# Patient Record
Sex: FEMALE | Race: BLACK OR AFRICAN AMERICAN | NOT HISPANIC OR LATINO | Employment: UNEMPLOYED | URBAN - METROPOLITAN AREA
[De-identification: names, ages, dates, MRNs, and addresses within clinical notes are randomized per-mention and may not be internally consistent; named-entity substitution may affect disease eponyms.]

---

## 2023-10-05 ENCOUNTER — TELEPHONE (OUTPATIENT)
Dept: ADMISSION | Facility: HOSPITAL | Age: 53
End: 2023-10-05
Payer: MEDICAID

## 2023-10-05 DIAGNOSIS — D57.00 SICKLE CELL DISEASE WITH CRISIS (MULTI): Primary | ICD-10-CM

## 2023-10-05 RX ORDER — OXYCODONE HYDROCHLORIDE 5 MG/1
5 TABLET ORAL EVERY 6 HOURS PRN
Qty: 30 TABLET | Refills: 0 | Status: SHIPPED | OUTPATIENT
Start: 2023-10-05 | End: 2023-10-19 | Stop reason: SDUPTHER

## 2023-10-05 RX ORDER — OXYCODONE HYDROCHLORIDE 5 MG/1
5 TABLET ORAL EVERY 6 HOURS PRN
COMMUNITY
End: 2023-10-05 | Stop reason: SDUPTHER

## 2023-10-19 ENCOUNTER — TELEPHONE (OUTPATIENT)
Dept: ADMISSION | Facility: HOSPITAL | Age: 53
End: 2023-10-19
Payer: MEDICAID

## 2023-10-19 DIAGNOSIS — D57.00 SICKLE CELL DISEASE WITH CRISIS (MULTI): ICD-10-CM

## 2023-10-19 RX ORDER — OXYCODONE HYDROCHLORIDE 5 MG/1
5 TABLET ORAL EVERY 6 HOURS PRN
Qty: 30 TABLET | Refills: 0 | Status: SHIPPED | OUTPATIENT
Start: 2023-10-19 | End: 2023-11-02 | Stop reason: SDUPTHER

## 2023-11-02 ENCOUNTER — TELEPHONE (OUTPATIENT)
Dept: ADMISSION | Facility: HOSPITAL | Age: 53
End: 2023-11-02
Payer: MEDICAID

## 2023-11-02 DIAGNOSIS — D57.00 SICKLE CELL DISEASE WITH CRISIS (MULTI): ICD-10-CM

## 2023-11-02 RX ORDER — OXYCODONE HYDROCHLORIDE 5 MG/1
5 TABLET ORAL EVERY 6 HOURS PRN
Qty: 30 TABLET | Refills: 0 | Status: SHIPPED | OUTPATIENT
Start: 2023-11-02 | End: 2023-11-17 | Stop reason: SDUPTHER

## 2023-11-02 NOTE — TELEPHONE ENCOUNTER
Jennifer Machado called the refill line for Oxycodone 5mg. Requesting refills be sent to [facility] pharmacy; message sent to Sickle Cell team to submit.

## 2023-11-10 DIAGNOSIS — D57.00 SICKLE CELL DISEASE WITH CRISIS (MULTI): Primary | ICD-10-CM

## 2023-11-16 DIAGNOSIS — D57.00 SICKLE CELL DISEASE WITH CRISIS (MULTI): Primary | ICD-10-CM

## 2023-11-16 RX ORDER — CYCLOBENZAPRINE HCL 5 MG
5 TABLET ORAL 3 TIMES DAILY
COMMUNITY
Start: 2023-06-16 | End: 2023-11-16 | Stop reason: SDUPTHER

## 2023-11-16 NOTE — TELEPHONE ENCOUNTER
Pt called the refill line requesting a refill on her Oxycodone 5mg and Flexeril 5mg. Flexeril pend/send to MD to refill. Pt uses [facility] on file. Message sent to the team.

## 2023-11-17 RX ORDER — NALOXONE HYDROCHLORIDE 4 MG/.1ML
4 SPRAY NASAL AS NEEDED
Qty: 2 EACH | Refills: 0 | Status: SHIPPED | OUTPATIENT
Start: 2023-11-17 | End: 2024-11-16

## 2023-11-17 RX ORDER — CYCLOBENZAPRINE HCL 5 MG
5 TABLET ORAL 3 TIMES DAILY
Qty: 60 TABLET | Refills: 0 | Status: SHIPPED | OUTPATIENT
Start: 2023-11-17 | End: 2023-11-29 | Stop reason: SDUPTHER

## 2023-11-17 RX ORDER — OXYCODONE HYDROCHLORIDE 5 MG/1
5 TABLET ORAL EVERY 6 HOURS PRN
Qty: 30 TABLET | Refills: 0 | Status: SHIPPED | OUTPATIENT
Start: 2023-11-17 | End: 2023-11-29 | Stop reason: SDUPTHER

## 2023-11-29 DIAGNOSIS — D57.00 SICKLE CELL DISEASE WITH CRISIS (MULTI): ICD-10-CM

## 2023-11-29 RX ORDER — CYCLOBENZAPRINE HCL 5 MG
5 TABLET ORAL 3 TIMES DAILY
Qty: 60 TABLET | Refills: 0 | Status: SHIPPED | OUTPATIENT
Start: 2023-11-29

## 2023-11-29 RX ORDER — OXYCODONE HYDROCHLORIDE 5 MG/1
5 TABLET ORAL EVERY 6 HOURS PRN
Qty: 30 TABLET | Refills: 0 | Status: SHIPPED | OUTPATIENT
Start: 2023-11-29 | End: 2023-12-19 | Stop reason: SDUPTHER

## 2023-12-01 DIAGNOSIS — D57.00 SICKLE CELL DISEASE WITH CRISIS (MULTI): ICD-10-CM

## 2023-12-05 ENCOUNTER — TELEPHONE (OUTPATIENT)
Dept: ADMISSION | Facility: HOSPITAL | Age: 53
End: 2023-12-05
Payer: MEDICAID

## 2023-12-05 DIAGNOSIS — D57.00 SICKLE CELL DISEASE WITH CRISIS (MULTI): Primary | ICD-10-CM

## 2023-12-05 RX ORDER — IBUPROFEN 600 MG/1
600 TABLET ORAL EVERY 8 HOURS PRN
Qty: 30 TABLET | Refills: 1 | Status: SHIPPED | OUTPATIENT
Start: 2023-12-05 | End: 2024-01-04

## 2023-12-05 RX ORDER — ACETAMINOPHEN 500 MG
1000 TABLET ORAL EVERY 8 HOURS PRN
Qty: 60 TABLET | Refills: 1 | Status: SHIPPED | OUTPATIENT
Start: 2023-12-05 | End: 2023-12-15

## 2023-12-05 NOTE — TELEPHONE ENCOUNTER
Pt states she has been sick for 2 days, tested positive for covid. Pt is experiencing some bone/joint pain as well.   She is not feeling well and would like to speak to Dr. Ray re return to work letter needed.

## 2023-12-07 NOTE — TELEPHONE ENCOUNTER
Pt states that she is awaiting letter to work - she feels well enough to return to work today (Thur 12/7)   She works from home.   Please send letter to email address as request.

## 2023-12-11 NOTE — TELEPHONE ENCOUNTER
Pt left a voicemail on RN triage line following up on a return to work letter.  She went back to work yesterday, 12/10/23.

## 2023-12-19 ENCOUNTER — TELEPHONE (OUTPATIENT)
Dept: ADMISSION | Facility: HOSPITAL | Age: 53
End: 2023-12-19
Payer: MEDICAID

## 2023-12-19 DIAGNOSIS — D57.00 SICKLE CELL DISEASE WITH CRISIS (MULTI): ICD-10-CM

## 2023-12-19 RX ORDER — OXYCODONE HYDROCHLORIDE 5 MG/1
5 TABLET ORAL EVERY 6 HOURS PRN
Qty: 30 TABLET | Refills: 0 | Status: SHIPPED | OUTPATIENT
Start: 2023-12-19 | End: 2023-12-29 | Stop reason: SDUPTHER

## 2023-12-19 NOTE — TELEPHONE ENCOUNTER
Jennifer Machado called the refill line for oxycodone 5mg. Requesting refills be sent to [facility] pharmacy; message sent to Sickle Cell team to submit.

## 2023-12-29 ENCOUNTER — TELEPHONE (OUTPATIENT)
Dept: ADMISSION | Facility: HOSPITAL | Age: 53
End: 2023-12-29
Payer: MEDICAID

## 2023-12-29 DIAGNOSIS — D57.00 SICKLE CELL DISEASE WITH CRISIS (MULTI): ICD-10-CM

## 2023-12-29 RX ORDER — OXYCODONE HYDROCHLORIDE 5 MG/1
5 TABLET ORAL EVERY 6 HOURS PRN
Qty: 30 TABLET | Refills: 0 | Status: SHIPPED | OUTPATIENT
Start: 2023-12-29 | End: 2024-01-23 | Stop reason: SDUPTHER

## 2024-01-05 DIAGNOSIS — D57.00 SICKLE CELL DISEASE WITH CRISIS (MULTI): Primary | ICD-10-CM

## 2024-01-23 ENCOUNTER — TELEPHONE (OUTPATIENT)
Dept: ADMISSION | Facility: HOSPITAL | Age: 54
End: 2024-01-23
Payer: COMMERCIAL

## 2024-01-23 DIAGNOSIS — D57.00 SICKLE CELL DISEASE WITH CRISIS (MULTI): ICD-10-CM

## 2024-01-23 RX ORDER — OXYCODONE HYDROCHLORIDE 5 MG/1
5 TABLET ORAL EVERY 6 HOURS PRN
Qty: 30 TABLET | Refills: 0 | Status: SHIPPED | OUTPATIENT
Start: 2024-01-23 | End: 2024-02-08 | Stop reason: ALTCHOICE

## 2024-01-23 NOTE — TELEPHONE ENCOUNTER
Jennifer Machado called the refill line for Oxycodone. Requesting refills be sent to [facility] pharmacy; message sent to Sickle Cell team to submit.

## 2024-02-08 ENCOUNTER — OFFICE VISIT (OUTPATIENT)
Dept: HEMATOLOGY/ONCOLOGY | Facility: HOSPITAL | Age: 54
End: 2024-02-08
Payer: COMMERCIAL

## 2024-02-08 VITALS
HEART RATE: 71 BPM | WEIGHT: 162.26 LBS | BODY MASS INDEX: 29.86 KG/M2 | OXYGEN SATURATION: 100 % | HEIGHT: 62 IN | SYSTOLIC BLOOD PRESSURE: 120 MMHG | TEMPERATURE: 97.2 F | RESPIRATION RATE: 18 BRPM | DIASTOLIC BLOOD PRESSURE: 62 MMHG

## 2024-02-08 DIAGNOSIS — D57.20 SICKLE CELL DISEASE, TYPE SC, WITHOUT CRISIS (MULTI): Primary | ICD-10-CM

## 2024-02-08 PROCEDURE — 99203 OFFICE O/P NEW LOW 30 MIN: CPT | Performed by: NURSE PRACTITIONER

## 2024-02-08 PROCEDURE — 99213 OFFICE O/P EST LOW 20 MIN: CPT | Performed by: NURSE PRACTITIONER

## 2024-02-08 PROCEDURE — 4004F PT TOBACCO SCREEN RCVD TLK: CPT | Performed by: NURSE PRACTITIONER

## 2024-02-08 RX ORDER — FOLIC ACID 1 MG/1
1 TABLET ORAL DAILY
Qty: 90 TABLET | Refills: 1 | Status: SHIPPED | OUTPATIENT
Start: 2024-02-08 | End: 2024-04-12 | Stop reason: SDUPTHER

## 2024-02-08 RX ORDER — OXYCODONE AND ACETAMINOPHEN 5; 325 MG/1; MG/1
1 TABLET ORAL EVERY 6 HOURS PRN
Qty: 5 TABLET | Refills: 0 | Status: SHIPPED | OUTPATIENT
Start: 2024-02-08 | End: 2024-02-12 | Stop reason: WASHOUT

## 2024-02-08 ASSESSMENT — PAIN SCALES - GENERAL: PAINLEVEL: 0-NO PAIN

## 2024-02-08 NOTE — PROGRESS NOTES
"Patient ID: Jennifer Machado is a 54 y.o. female.  Referring Physician: No referring provider defined for this encounter.  Primary Care Provider: Shaw Griffiths DO  Visit Type: Follow Up      Subjective   54 year old black female presents with Sickle Cell Disease Type S.C. States she has a lot of achiness when the weather is rainy or cold. Does not like to take medication or come to the doctor. She tries to ignore pain when it occurs. Does not leave the home very often. Denies constipation as she does take a stool softener. No recent visits to [facility] or ED. She referenced a \"little coma\" 7 years ago. She wants to stay on Folic Acid since she feels it helps her and wants to take Percoset instead of Oxycodone and Tylenol separately. Her demeanor is talkative and cooperative with me.    HPI Sickle Cell S.C. type with pain in left shoulder and left side of body.    Review of Systems - Oncology     Objective   BSA: 1.8 meters squared  /62 (BP Location: Left arm, Patient Position: Sitting, BP Cuff Size: Adult)   Pulse 71   Temp 36.2 °C (97.2 °F) (Temporal)   Resp 18   Ht (S) 1.576 m (5' 2.05\")   Wt 73.6 kg (162 lb 4.1 oz)   SpO2 100%   BMI 29.63 kg/m²      has no past medical history on file.   has a past surgical history that includes MR angio head wo IV contrast (4/21/2017) and MR angio neck wo IV contrast (4/21/2017).  No family history on file.  Oncology History    No history exists.       Jennifer Machado  reports that she has been smoking cigarettes. She has never used smokeless tobacco.  She  has no history on file for alcohol use.  She  has no history on file for drug use.    Physical Exam  Vitals reviewed.   Constitutional:       Appearance: Normal appearance.   HENT:      Head: Normocephalic.      Nose: Nose normal.   Eyes:      General: Scleral icterus present.      Pupils: Pupils are equal, round, and reactive to light.   Cardiovascular:      Rate and Rhythm: Normal rate and regular rhythm. " "  Pulmonary:      Effort: Pulmonary effort is normal.      Breath sounds: Normal breath sounds.   Abdominal:      General: Abdomen is flat. Bowel sounds are normal.      Palpations: Abdomen is soft.   Musculoskeletal:         General: Normal range of motion.      Cervical back: Normal range of motion.   Skin:     General: Skin is dry.      Capillary Refill: Capillary refill takes less than 2 seconds.   Neurological:      General: No focal deficit present.      Mental Status: She is alert and oriented to person, place, and time.   Psychiatric:         Mood and Affect: Mood normal.         Behavior: Behavior normal.     WBC   Date/Time Value Ref Range Status   03/14/2023 09:21 AM CANCELED       Comment:     Result canceled by the ancillary.   12/27/2022 02:42 PM 6.4 4.4 - 11.3 x10E9/L Final   07/27/2022 09:42 AM 8.3 4.4 - 11.3 x10E9/L Final     nRBC   Date Value Ref Range Status   03/14/2023 CANCELED       Comment:     Result canceled by the ancillary.     RBC   Date Value Ref Range Status   03/14/2023 CANCELED       Comment:     Result canceled by the ancillary.   12/27/2022 3.43 (L) 4.00 - 5.20 x10E12/L Final   07/27/2022 3.80 (L) 4.00 - 5.20 x10E12/L Final     Hemoglobin   Date Value Ref Range Status   03/14/2023 CANCELED       Comment:     Result canceled by the ancillary.   12/27/2022 10.5 (L) 12.0 - 16.0 g/dL Final   07/27/2022 11.4 (L) 12.0 - 16.0 g/dL Final     Hematocrit   Date Value Ref Range Status   03/14/2023 CANCELED       Comment:     Result canceled by the ancillary.   12/27/2022 28.8 (L) 36.0 - 46.0 % Final   07/27/2022 32.5 (L) 36.0 - 46.0 % Final     MCV   Date/Time Value Ref Range Status   03/14/2023 09:21 AM CANCELED       Comment:     Result canceled by the ancillary.   12/27/2022 02:42 PM 84 80 - 100 fL Final   07/27/2022 09:42 AM 86 80 - 100 fL Final     No results found for: \"MCH\"  MCHC   Date/Time Value Ref Range Status   03/14/2023 09:21 AM CANCELED       Comment:     Result canceled by the " "ancillary.   12/27/2022 02:42 PM 36.5 (H) 32.0 - 36.0 g/dL Final   07/27/2022 09:42 AM 35.1 32.0 - 36.0 g/dL Final     RDW   Date/Time Value Ref Range Status   03/14/2023 09:21 AM CANCELED       Comment:     Result canceled by the ancillary.   12/27/2022 02:42 PM 16.7 (H) 11.5 - 14.5 % Final   07/27/2022 09:42 AM 16.6 (H) 11.5 - 14.5 % Final     Platelets   Date/Time Value Ref Range Status   03/14/2023 09:21 AM CANCELED       Comment:     Result canceled by the ancillary.   12/27/2022 02:42  150 - 450 x10E9/L Final   07/27/2022 09:42  150 - 450 x10E9/L Final     No results found for: \"MPV\"  Neutrophils %   Date/Time Value Ref Range Status   03/14/2023 09:21 AM CANCELED       Comment:     Result canceled by the ancillary.   12/27/2022 02:42 PM 64.2 40.0 - 80.0 % Final   07/27/2022 09:42 AM 62.3 40.0 - 80.0 % Final     Immature Granulocytes %, Automated   Date/Time Value Ref Range Status   03/14/2023 09:21 AM CANCELED       Comment:      Immature Granulocyte Count (IG) includes promyelocytes,    myelocytes and metamyelocytes but does not include bands.   Percent differential counts (%) should be interpreted in the   context of the absolute cell counts (cells/L).    Result canceled by the ancillary.     12/27/2022 02:42 PM 0.3 0.0 - 0.9 % Final     Comment:      Immature Granulocyte Count (IG) includes promyelocytes,    myelocytes and metamyelocytes but does not include bands.   Percent differential counts (%) should be interpreted in the   context of the absolute cell counts (cells/L).     07/27/2022 09:42 AM 0.4 0.0 - 0.9 % Final     Comment:      Immature Granulocyte Count (IG) includes promyelocytes,    myelocytes and metamyelocytes but does not include bands.   Percent differential counts (%) should be interpreted in the   context of the absolute cell counts (cells/L).       Lymphocytes %   Date/Time Value Ref Range Status   03/14/2023 09:21 AM CANCELED       Comment:     Result canceled by the " "ancillary.   12/27/2022 02:42 PM 29.4 13.0 - 44.0 % Final   07/27/2022 09:42 AM 29.3 13.0 - 44.0 % Final     Monocytes %   Date/Time Value Ref Range Status   03/14/2023 09:21 AM CANCELED       Comment:     Result canceled by the ancillary.   12/27/2022 02:42 PM 3.9 2.0 - 10.0 % Final   07/27/2022 09:42 AM 5.4 2.0 - 10.0 % Final     Eosinophils %   Date/Time Value Ref Range Status   03/14/2023 09:21 AM CANCELED       Comment:     Result canceled by the ancillary.   12/27/2022 02:42 PM 1.7 0.0 - 6.0 % Final   07/27/2022 09:42 AM 2.2 0.0 - 6.0 % Final     Basophils %   Date/Time Value Ref Range Status   03/14/2023 09:21 AM CANCELED       Comment:     Result canceled by the ancillary.   12/27/2022 02:42 PM 0.5 0.0 - 2.0 % Final   07/27/2022 09:42 AM 0.4 0.0 - 2.0 % Final     Neutrophils Absolute   Date/Time Value Ref Range Status   03/14/2023 09:21 AM CANCELED       Comment:     Result canceled by the ancillary.   12/27/2022 02:42 PM 4.12 1.20 - 7.70 x10E9/L Final   07/27/2022 09:42 AM 5.20 1.20 - 7.70 x10E9/L Final     No results found for: \"IGABSOL\"  Lymphocytes Absolute   Date/Time Value Ref Range Status   03/14/2023 09:21 AM CANCELED       Comment:     Result canceled by the ancillary.   12/27/2022 02:42 PM 1.89 1.20 - 4.80 x10E9/L Final   07/27/2022 09:42 AM 2.44 1.20 - 4.80 x10E9/L Final     Monocytes Absolute   Date/Time Value Ref Range Status   03/14/2023 09:21 AM CANCELED       Comment:     Result canceled by the ancillary.   12/27/2022 02:42 PM 0.25 0.10 - 1.00 x10E9/L Final   07/27/2022 09:42 AM 0.45 0.10 - 1.00 x10E9/L Final     Eosinophils Absolute   Date/Time Value Ref Range Status   03/14/2023 09:21 AM CANCELED       Comment:     Result canceled by the ancillary.   12/27/2022 02:42 PM 0.11 0.00 - 0.70 x10E9/L Final   07/27/2022 09:42 AM 0.18 0.00 - 0.70 x10E9/L Final     Basophils Absolute   Date/Time Value Ref Range Status   03/14/2023 09:21 AM CANCELED       Comment:     Result canceled by the " "ancillary.   12/27/2022 02:42 PM 0.03 0.00 - 0.10 x10E9/L Final   07/27/2022 09:42 AM 0.03 0.00 - 0.10 x10E9/L Final       No components found for: \"PT\"  No results found for: \"APTT\"    Assessment/Plan  Follow up in 3 months. Change Oxycodone script to Percoset for pain management. Add folic acid 1 mg daily. Do lab work today for Sickle Cell panel.                 "

## 2024-02-12 ENCOUNTER — TELEPHONE (OUTPATIENT)
Dept: ADMISSION | Facility: HOSPITAL | Age: 54
End: 2024-02-12
Payer: COMMERCIAL

## 2024-02-12 DIAGNOSIS — D57.00 SICKLE CELL DISEASE WITH CRISIS (MULTI): ICD-10-CM

## 2024-02-12 RX ORDER — OXYCODONE HYDROCHLORIDE 5 MG/1
5 TABLET ORAL EVERY 6 HOURS PRN
Qty: 30 TABLET | Refills: 0 | Status: SHIPPED | OUTPATIENT
Start: 2024-02-12 | End: 2024-02-29 | Stop reason: SDUPTHER

## 2024-02-12 NOTE — TELEPHONE ENCOUNTER
Jennifer Machado called the refill line for oxycodone. Requesting refills be sent to [facility] pharmacy; message sent to Sickle Cell team to submit.    Patient states a recent change in her oxycodone prescription, when she went to  the refill there were only 5 pills ordered. Patient states she typically receives a 2 week supply.

## 2024-02-29 ENCOUNTER — TELEPHONE (OUTPATIENT)
Dept: HEMATOLOGY/ONCOLOGY | Facility: HOSPITAL | Age: 54
End: 2024-02-29
Payer: COMMERCIAL

## 2024-02-29 DIAGNOSIS — D57.00 SICKLE CELL DISEASE WITH CRISIS (MULTI): ICD-10-CM

## 2024-02-29 NOTE — TELEPHONE ENCOUNTER
Pt requesting refill   Oxycodone 5mg. 1 tablet every 6 hrs prn  Please use [facility] (added to preferred pharmacy list)  Last FUV 2/8

## 2024-03-01 RX ORDER — OXYCODONE HYDROCHLORIDE 5 MG/1
5 TABLET ORAL EVERY 6 HOURS PRN
Qty: 30 TABLET | Refills: 0 | Status: SHIPPED | OUTPATIENT
Start: 2024-03-01 | End: 2024-03-15 | Stop reason: SDUPTHER

## 2024-03-15 ENCOUNTER — TELEPHONE (OUTPATIENT)
Dept: ADMISSION | Facility: HOSPITAL | Age: 54
End: 2024-03-15
Payer: COMMERCIAL

## 2024-03-15 DIAGNOSIS — D57.00 SICKLE CELL DISEASE WITH CRISIS (MULTI): ICD-10-CM

## 2024-03-15 RX ORDER — OXYCODONE HYDROCHLORIDE 5 MG/1
5 TABLET ORAL EVERY 6 HOURS PRN
Qty: 30 TABLET | Refills: 0 | Status: SHIPPED
Start: 2024-03-15 | End: 2024-03-27 | Stop reason: SDUPTHER

## 2024-03-27 ENCOUNTER — TELEPHONE (OUTPATIENT)
Dept: ADMISSION | Facility: HOSPITAL | Age: 54
End: 2024-03-27
Payer: COMMERCIAL

## 2024-03-27 DIAGNOSIS — D57.00 SICKLE CELL DISEASE WITH CRISIS (MULTI): ICD-10-CM

## 2024-03-27 RX ORDER — OXYCODONE HYDROCHLORIDE 5 MG/1
5 TABLET ORAL EVERY 6 HOURS PRN
Qty: 30 TABLET | Refills: 0 | Status: SHIPPED | OUTPATIENT
Start: 2024-03-27 | End: 2024-04-12 | Stop reason: SDUPTHER

## 2024-03-27 RX ORDER — OXYCODONE HYDROCHLORIDE 5 MG/1
5 TABLET ORAL EVERY 6 HOURS PRN
Qty: 30 TABLET | Refills: 0 | Status: SHIPPED | OUTPATIENT
Start: 2024-03-27 | End: 2024-03-27 | Stop reason: SDUPTHER

## 2024-04-12 ENCOUNTER — TELEPHONE (OUTPATIENT)
Dept: HEMATOLOGY/ONCOLOGY | Facility: HOSPITAL | Age: 54
End: 2024-04-12
Payer: COMMERCIAL

## 2024-04-12 DIAGNOSIS — D57.20 SICKLE CELL DISEASE, TYPE SC, WITHOUT CRISIS (MULTI): ICD-10-CM

## 2024-04-12 DIAGNOSIS — D57.00 SICKLE CELL DISEASE WITH CRISIS (MULTI): ICD-10-CM

## 2024-04-12 RX ORDER — FOLIC ACID 1 MG/1
1 TABLET ORAL DAILY
Qty: 30 TABLET | Refills: 11 | Status: SHIPPED | OUTPATIENT
Start: 2024-04-12 | End: 2024-05-29 | Stop reason: SDUPTHER

## 2024-04-12 RX ORDER — OXYCODONE HYDROCHLORIDE 5 MG/1
5 TABLET ORAL EVERY 6 HOURS PRN
Qty: 30 TABLET | Refills: 0 | Status: SHIPPED | OUTPATIENT
Start: 2024-04-12 | End: 2024-04-25 | Stop reason: SDUPTHER

## 2024-04-12 NOTE — TELEPHONE ENCOUNTER
Pt requesting a refill of oxycodone 5mg q6 prn, #30 and folic acid 1mg daily, #30.  [facility].

## 2024-04-25 ENCOUNTER — TELEPHONE (OUTPATIENT)
Dept: HEMATOLOGY/ONCOLOGY | Facility: HOSPITAL | Age: 54
End: 2024-04-25
Payer: COMMERCIAL

## 2024-04-25 DIAGNOSIS — D57.00 SICKLE CELL DISEASE WITH CRISIS (MULTI): ICD-10-CM

## 2024-04-25 RX ORDER — OXYCODONE HYDROCHLORIDE 5 MG/1
5 TABLET ORAL EVERY 6 HOURS PRN
Qty: 30 TABLET | Refills: 0 | Status: SHIPPED | OUTPATIENT
Start: 2024-04-25 | End: 2024-05-09 | Stop reason: SDUPTHER

## 2024-04-25 NOTE — TELEPHONE ENCOUNTER
Refill request received for Oxycodone 5mg.  [facility] [id number] [facility] [place].  Message sent to Sickle Cell team.

## 2024-05-08 ENCOUNTER — APPOINTMENT (OUTPATIENT)
Dept: HEMATOLOGY/ONCOLOGY | Facility: HOSPITAL | Age: 54
End: 2024-05-08
Payer: COMMERCIAL

## 2024-05-09 ENCOUNTER — TELEPHONE (OUTPATIENT)
Dept: ADMISSION | Facility: HOSPITAL | Age: 54
End: 2024-05-09
Payer: COMMERCIAL

## 2024-05-09 DIAGNOSIS — D57.00 SICKLE CELL DISEASE WITH CRISIS (MULTI): ICD-10-CM

## 2024-05-09 RX ORDER — OXYCODONE HYDROCHLORIDE 5 MG/1
5 TABLET ORAL EVERY 6 HOURS PRN
Qty: 30 TABLET | Refills: 0 | Status: SHIPPED | OUTPATIENT
Start: 2024-05-09 | End: 2024-05-16

## 2024-05-09 NOTE — TELEPHONE ENCOUNTER
Jennifer Machado called the refill line for Oxycosone. Requesting refills be sent to [facility] pharmacy; message sent to Sickle Cell team to submit.

## 2024-05-24 ENCOUNTER — TELEPHONE (OUTPATIENT)
Dept: ADMISSION | Facility: HOSPITAL | Age: 54
End: 2024-05-24
Payer: COMMERCIAL

## 2024-05-24 DIAGNOSIS — D57.20 SICKLE CELL DISEASE, TYPE SC, WITHOUT CRISIS (MULTI): Primary | ICD-10-CM

## 2024-05-24 DIAGNOSIS — D57.00 SICKLE CELL DISEASE WITH CRISIS (MULTI): Primary | ICD-10-CM

## 2024-05-24 RX ORDER — ACETAMINOPHEN 500 MG
1000 TABLET ORAL EVERY 6 HOURS PRN
Qty: 30 TABLET | Refills: 0 | Status: SHIPPED | OUTPATIENT
Start: 2024-05-24 | End: 2024-06-03

## 2024-05-24 RX ORDER — ACETAMINOPHEN 500 MG
1000 TABLET ORAL EVERY 8 HOURS PRN
Qty: 60 TABLET | Refills: 0 | Status: SHIPPED | OUTPATIENT
Start: 2024-05-24 | End: 2024-06-23

## 2024-05-24 RX ORDER — OXYCODONE HYDROCHLORIDE 5 MG/1
5 CAPSULE ORAL ONCE
OUTPATIENT
Start: 2024-05-24 | End: 2024-05-24

## 2024-05-24 NOTE — TELEPHONE ENCOUNTER
Pt requesting refills  Oxycodone 5mg- no on current medication list  Acetaminophen 500mg. 2 tablets every 6 hrs prn- no on current medication list.   [facility]  Last FUV 2/8 with next FUV 5/29

## 2024-05-29 ENCOUNTER — TELEMEDICINE (OUTPATIENT)
Dept: HEMATOLOGY/ONCOLOGY | Facility: HOSPITAL | Age: 54
End: 2024-05-29
Payer: COMMERCIAL

## 2024-05-29 DIAGNOSIS — D57.20 SICKLE CELL DISEASE, TYPE SC, WITHOUT CRISIS (MULTI): ICD-10-CM

## 2024-05-29 PROCEDURE — 99214 OFFICE O/P EST MOD 30 MIN: CPT | Performed by: NURSE PRACTITIONER

## 2024-05-29 RX ORDER — FOLIC ACID 1 MG/1
1 TABLET ORAL DAILY
Qty: 30 TABLET | Refills: 11 | Status: SHIPPED | OUTPATIENT
Start: 2024-05-29 | End: 2025-05-29

## 2024-05-29 RX ORDER — OXYCODONE AND ACETAMINOPHEN 5; 325 MG/1; MG/1
1 TABLET ORAL EVERY 6 HOURS PRN
Qty: 60 TABLET | Refills: 0 | Status: SHIPPED | OUTPATIENT
Start: 2024-05-29 | End: 2024-07-28

## 2024-05-29 NOTE — PROGRESS NOTES
Patient ID: Jennifer Machado is a 54 y.o. female.  Referring Physician: Zora Sun, APRN-CNP  [address]  Department of Medicine-Hematology [place] [address]  Primary Care Provider: Shaw Griffiths DO  Visit Type: Follow Up      Subjective  54 year old black female presents for e telehealth visit for Sickle Cell S.C. Disease.  States she feels achey today especially her left arm. States she is more achey when the weather is changing. She uses Tylenol for pain relief. Requests Percoset as per our last appointment. Denies constipation, uses stool softener daily. States she is working from home and taking care of her mother.  She is able to move about at will. Does not take any disease modifying therapy. She has not had any [facility] or ED visits.        HPI    Review of Systems - Oncology     Objective   BSA: There is no height or weight on file to calculate BSA.  There were no vitals taken for this visit.     has no past medical history on file.   has a past surgical history that includes MR angio head wo IV contrast (4/21/2017) and MR angio neck wo IV contrast (4/21/2017).  No family history on file.  Oncology History    No history exists.       Jennifer Machado  reports that she has been smoking cigarettes. She has never used smokeless tobacco.  She  has no history on file for alcohol use.  She  has no history on file for drug use.    Physical Exam    WBC   Date/Time Value Ref Range Status   03/14/2023 09:21 AM CANCELED       Comment:     Result canceled by the ancillary.   12/27/2022 02:42 PM 6.4 4.4 - 11.3 x10E9/L Final   07/27/2022 09:42 AM 8.3 4.4 - 11.3 x10E9/L Final     nRBC   Date Value Ref Range Status   03/14/2023 CANCELED       Comment:     Result canceled by the ancillary.     RBC   Date Value Ref Range Status   03/14/2023 CANCELED       Comment:     Result canceled by the ancillary.   12/27/2022 3.43 (L) 4.00 - 5.20 x10E12/L Final   07/27/2022 3.80 (L) 4.00 - 5.20 x10E12/L  "Final     Hemoglobin   Date Value Ref Range Status   03/14/2023 CANCELED       Comment:     Result canceled by the ancillary.   12/27/2022 10.5 (L) 12.0 - 16.0 g/dL Final   07/27/2022 11.4 (L) 12.0 - 16.0 g/dL Final     Hematocrit   Date Value Ref Range Status   03/14/2023 CANCELED       Comment:     Result canceled by the ancillary.   12/27/2022 28.8 (L) 36.0 - 46.0 % Final   07/27/2022 32.5 (L) 36.0 - 46.0 % Final     MCV   Date/Time Value Ref Range Status   03/14/2023 09:21 AM CANCELED       Comment:     Result canceled by the ancillary.   12/27/2022 02:42 PM 84 80 - 100 fL Final   07/27/2022 09:42 AM 86 80 - 100 fL Final     No results found for: \"MCH\"  MCHC   Date/Time Value Ref Range Status   03/14/2023 09:21 AM CANCELED       Comment:     Result canceled by the ancillary.   12/27/2022 02:42 PM 36.5 (H) 32.0 - 36.0 g/dL Final   07/27/2022 09:42 AM 35.1 32.0 - 36.0 g/dL Final     RDW   Date/Time Value Ref Range Status   03/14/2023 09:21 AM CANCELED       Comment:     Result canceled by the ancillary.   12/27/2022 02:42 PM 16.7 (H) 11.5 - 14.5 % Final   07/27/2022 09:42 AM 16.6 (H) 11.5 - 14.5 % Final     Platelets   Date/Time Value Ref Range Status   03/14/2023 09:21 AM CANCELED       Comment:     Result canceled by the ancillary.   12/27/2022 02:42  150 - 450 x10E9/L Final   07/27/2022 09:42  150 - 450 x10E9/L Final     No results found for: \"MPV\"  Neutrophils %   Date/Time Value Ref Range Status   03/14/2023 09:21 AM CANCELED       Comment:     Result canceled by the ancillary.   12/27/2022 02:42 PM 64.2 40.0 - 80.0 % Final   07/27/2022 09:42 AM 62.3 40.0 - 80.0 % Final     Immature Granulocytes %, Automated   Date/Time Value Ref Range Status   03/14/2023 09:21 AM CANCELED       Comment:      Immature Granulocyte Count (IG) includes promyelocytes,    myelocytes and metamyelocytes but does not include bands.   Percent differential counts (%) should be interpreted in the   context of the absolute " "cell counts (cells/L).    Result canceled by the ancillary.     12/27/2022 02:42 PM 0.3 0.0 - 0.9 % Final     Comment:      Immature Granulocyte Count (IG) includes promyelocytes,    myelocytes and metamyelocytes but does not include bands.   Percent differential counts (%) should be interpreted in the   context of the absolute cell counts (cells/L).     07/27/2022 09:42 AM 0.4 0.0 - 0.9 % Final     Comment:      Immature Granulocyte Count (IG) includes promyelocytes,    myelocytes and metamyelocytes but does not include bands.   Percent differential counts (%) should be interpreted in the   context of the absolute cell counts (cells/L).       Lymphocytes %   Date/Time Value Ref Range Status   03/14/2023 09:21 AM CANCELED       Comment:     Result canceled by the ancillary.   12/27/2022 02:42 PM 29.4 13.0 - 44.0 % Final   07/27/2022 09:42 AM 29.3 13.0 - 44.0 % Final     Monocytes %   Date/Time Value Ref Range Status   03/14/2023 09:21 AM CANCELED       Comment:     Result canceled by the ancillary.   12/27/2022 02:42 PM 3.9 2.0 - 10.0 % Final   07/27/2022 09:42 AM 5.4 2.0 - 10.0 % Final     Eosinophils %   Date/Time Value Ref Range Status   03/14/2023 09:21 AM CANCELED       Comment:     Result canceled by the ancillary.   12/27/2022 02:42 PM 1.7 0.0 - 6.0 % Final   07/27/2022 09:42 AM 2.2 0.0 - 6.0 % Final     Basophils %   Date/Time Value Ref Range Status   03/14/2023 09:21 AM CANCELED       Comment:     Result canceled by the ancillary.   12/27/2022 02:42 PM 0.5 0.0 - 2.0 % Final   07/27/2022 09:42 AM 0.4 0.0 - 2.0 % Final     Neutrophils Absolute   Date/Time Value Ref Range Status   03/14/2023 09:21 AM CANCELED       Comment:     Result canceled by the ancillary.   12/27/2022 02:42 PM 4.12 1.20 - 7.70 x10E9/L Final   07/27/2022 09:42 AM 5.20 1.20 - 7.70 x10E9/L Final     No results found for: \"IGABSOL\"  Lymphocytes Absolute   Date/Time Value Ref Range Status   03/14/2023 09:21 AM CANCELED       Comment:     " "Result canceled by the ancillary.   12/27/2022 02:42 PM 1.89 1.20 - 4.80 x10E9/L Final   07/27/2022 09:42 AM 2.44 1.20 - 4.80 x10E9/L Final     Monocytes Absolute   Date/Time Value Ref Range Status   03/14/2023 09:21 AM CANCELED       Comment:     Result canceled by the ancillary.   12/27/2022 02:42 PM 0.25 0.10 - 1.00 x10E9/L Final   07/27/2022 09:42 AM 0.45 0.10 - 1.00 x10E9/L Final     Eosinophils Absolute   Date/Time Value Ref Range Status   03/14/2023 09:21 AM CANCELED       Comment:     Result canceled by the ancillary.   12/27/2022 02:42 PM 0.11 0.00 - 0.70 x10E9/L Final   07/27/2022 09:42 AM 0.18 0.00 - 0.70 x10E9/L Final     Basophils Absolute   Date/Time Value Ref Range Status   03/14/2023 09:21 AM CANCELED       Comment:     Result canceled by the ancillary.   12/27/2022 02:42 PM 0.03 0.00 - 0.10 x10E9/L Final   07/27/2022 09:42 AM 0.03 0.00 - 0.10 x10E9/L Final       No components found for: \"PT\"  No results found for: \"APTT\"    Assessment/Plan  1 month follow up  Start with Percoset 5-325 mg every 6 hours for pain as needed  Do bloodwork ordered today  Continue with non-pharmaceutical methods of pain relief               "

## 2024-06-19 ENCOUNTER — TELEPHONE (OUTPATIENT)
Dept: ADMISSION | Facility: HOSPITAL | Age: 54
End: 2024-06-19
Payer: COMMERCIAL

## 2024-06-20 DIAGNOSIS — D57.20 SICKLE CELL DISEASE, TYPE SC, WITHOUT CRISIS (MULTI): ICD-10-CM

## 2024-06-20 RX ORDER — OXYCODONE AND ACETAMINOPHEN 5; 325 MG/1; MG/1
1 TABLET ORAL EVERY 6 HOURS PRN
Qty: 60 TABLET | Refills: 0 | Status: SHIPPED | OUTPATIENT
Start: 2024-06-20 | End: 2024-07-05

## 2024-07-08 ENCOUNTER — TELEPHONE (OUTPATIENT)
Dept: HEMATOLOGY/ONCOLOGY | Facility: HOSPITAL | Age: 54
End: 2024-07-08
Payer: COMMERCIAL

## 2024-07-08 DIAGNOSIS — D57.20 SICKLE CELL DISEASE, TYPE SC, WITHOUT CRISIS (MULTI): ICD-10-CM

## 2024-07-08 RX ORDER — OXYCODONE AND ACETAMINOPHEN 5; 325 MG/1; MG/1
1 TABLET ORAL EVERY 6 HOURS PRN
Qty: 60 TABLET | Refills: 0 | Status: SHIPPED | OUTPATIENT
Start: 2024-07-08 | End: 2024-07-23

## 2024-07-08 NOTE — TELEPHONE ENCOUNTER
Pt requesting a refill of oxycodone-acetaminophen 5-325mg q6 prn, #60.  Last prescribed 6/20/24.  [facility].

## 2024-07-19 ENCOUNTER — LAB (OUTPATIENT)
Dept: LAB | Facility: LAB | Age: 54
End: 2024-07-19
Payer: COMMERCIAL

## 2024-07-19 DIAGNOSIS — D57.20 SICKLE CELL DISEASE, TYPE SC, WITHOUT CRISIS (MULTI): ICD-10-CM

## 2024-07-19 LAB
ABO GROUP (TYPE) IN BLOOD: NORMAL
ALBUMIN SERPL BCP-MCNC: 4.3 G/DL (ref 3.4–5)
ALP SERPL-CCNC: 84 U/L (ref 33–110)
ALT SERPL W P-5'-P-CCNC: 8 U/L (ref 7–45)
ANION GAP SERPL CALC-SCNC: 12 MMOL/L (ref 10–20)
ANTIBODY SCREEN: NORMAL
AST SERPL W P-5'-P-CCNC: 11 U/L (ref 9–39)
BASOPHILS # BLD AUTO: 0.04 X10*3/UL (ref 0–0.1)
BASOPHILS NFR BLD AUTO: 0.6 %
BILIRUB SERPL-MCNC: 1.1 MG/DL (ref 0–1.2)
BUN SERPL-MCNC: 11 MG/DL (ref 6–23)
CALCIUM SERPL-MCNC: 9.5 MG/DL (ref 8.6–10.6)
CHLORIDE SERPL-SCNC: 107 MMOL/L (ref 98–107)
CO2 SERPL-SCNC: 27 MMOL/L (ref 21–32)
CREAT SERPL-MCNC: 1 MG/DL (ref 0.5–1.05)
EGFRCR SERPLBLD CKD-EPI 2021: 68 ML/MIN/1.73M*2
EOSINOPHIL # BLD AUTO: 0.12 X10*3/UL (ref 0–0.7)
EOSINOPHIL NFR BLD AUTO: 1.8 %
ERYTHROCYTE [DISTWIDTH] IN BLOOD BY AUTOMATED COUNT: 17.5 % (ref 11.5–14.5)
FERRITIN SERPL-MCNC: 315 NG/ML (ref 8–150)
GLUCOSE SERPL-MCNC: 133 MG/DL (ref 74–99)
HCT VFR BLD AUTO: 30.3 % (ref 36–46)
HGB BLD-MCNC: 10.5 G/DL (ref 12–16)
HGB RETIC QN: 30 PG (ref 28–38)
IMM GRANULOCYTES # BLD AUTO: 0.02 X10*3/UL (ref 0–0.7)
IMM GRANULOCYTES NFR BLD AUTO: 0.3 % (ref 0–0.9)
IMMATURE RETIC FRACTION: 35.2 %
LDH SERPL L TO P-CCNC: 153 U/L (ref 84–246)
LYMPHOCYTES # BLD AUTO: 1.97 X10*3/UL (ref 1.2–4.8)
LYMPHOCYTES NFR BLD AUTO: 29.2 %
MCH RBC QN AUTO: 30.4 PG (ref 26–34)
MCHC RBC AUTO-ENTMCNC: 34.7 G/DL (ref 32–36)
MCV RBC AUTO: 88 FL (ref 80–100)
MONOCYTES # BLD AUTO: 0.34 X10*3/UL (ref 0.1–1)
MONOCYTES NFR BLD AUTO: 5 %
NEUTROPHILS # BLD AUTO: 4.25 X10*3/UL (ref 1.2–7.7)
NEUTROPHILS NFR BLD AUTO: 63.1 %
NRBC BLD-RTO: 0 /100 WBCS (ref 0–0)
PLATELET # BLD AUTO: 178 X10*3/UL (ref 150–450)
POTASSIUM SERPL-SCNC: 4.6 MMOL/L (ref 3.5–5.3)
PROT SERPL-MCNC: 6.8 G/DL (ref 6.4–8.2)
RBC # BLD AUTO: 3.45 X10*6/UL (ref 4–5.2)
RETICS #: 0.15 X10*6/UL (ref 0.02–0.08)
RETICS/RBC NFR AUTO: 4.2 % (ref 0.5–2)
RH FACTOR (ANTIGEN D): NORMAL
SODIUM SERPL-SCNC: 141 MMOL/L (ref 136–145)
WBC # BLD AUTO: 6.7 X10*3/UL (ref 4.4–11.3)

## 2024-07-19 PROCEDURE — 86850 RBC ANTIBODY SCREEN: CPT

## 2024-07-19 PROCEDURE — 85045 AUTOMATED RETICULOCYTE COUNT: CPT

## 2024-07-19 PROCEDURE — 83615 LACTATE (LD) (LDH) ENZYME: CPT

## 2024-07-19 PROCEDURE — 82728 ASSAY OF FERRITIN: CPT

## 2024-07-19 PROCEDURE — 86901 BLOOD TYPING SEROLOGIC RH(D): CPT

## 2024-07-19 PROCEDURE — 85025 COMPLETE CBC W/AUTO DIFF WBC: CPT

## 2024-07-19 PROCEDURE — 83020 HEMOGLOBIN ELECTROPHORESIS: CPT

## 2024-07-19 PROCEDURE — 80053 COMPREHEN METABOLIC PANEL: CPT

## 2024-07-19 PROCEDURE — 83021 HEMOGLOBIN CHROMOTOGRAPHY: CPT

## 2024-07-19 PROCEDURE — 86900 BLOOD TYPING SEROLOGIC ABO: CPT

## 2024-07-23 LAB
HEMOGLOBIN A2: 3 % (ref 2–3.5)
HEMOGLOBIN C: 49.5 %
HEMOGLOBIN F: 0.7 % (ref 0–2)
HEMOGLOBIN IDENTIFICATION INTERPRETATION: ABNORMAL
HEMOGLOBIN S: 46.8 %
PATH REVIEW-HGB IDENTIFICATION: ABNORMAL

## 2024-07-24 ENCOUNTER — TELEPHONE (OUTPATIENT)
Dept: ADMISSION | Facility: HOSPITAL | Age: 54
End: 2024-07-24
Payer: COMMERCIAL

## 2024-07-24 DIAGNOSIS — D57.20 SICKLE CELL DISEASE, TYPE SC, WITHOUT CRISIS (MULTI): ICD-10-CM

## 2024-07-24 LAB
HGB A MFR BLD ELPH: 0 % (ref 95.8–98)
HGB A2 MFR BLD ELPH: 3.1 % (ref 2–3.3)
HGB F MFR BLD ELPH: 1.3 % (ref 0–0.9)
HGB FRACT BLD CE-IMP: ABNORMAL
HGB XXX MFR BLD ELPH: ABNORMAL %

## 2024-07-24 RX ORDER — OXYCODONE AND ACETAMINOPHEN 5; 325 MG/1; MG/1
1 TABLET ORAL EVERY 6 HOURS PRN
Qty: 60 TABLET | Refills: 0 | Status: SHIPPED | OUTPATIENT
Start: 2024-07-24 | End: 2024-08-08

## 2024-07-24 NOTE — TELEPHONE ENCOUNTER
Jennifer Machado called the refill line for Percocet 5mg and Ibuprofen. Requesting refills be sent to [facility] pharmacy; message sent to Sickle Cell team to submit.

## 2024-08-06 ENCOUNTER — TELEPHONE (OUTPATIENT)
Dept: ADMISSION | Facility: HOSPITAL | Age: 54
End: 2024-08-06
Payer: COMMERCIAL

## 2024-08-06 DIAGNOSIS — D57.20 SICKLE CELL DISEASE, TYPE SC, WITHOUT CRISIS (MULTI): ICD-10-CM

## 2024-08-06 RX ORDER — FOLIC ACID 1 MG/1
1 TABLET ORAL DAILY
Qty: 90 TABLET | Refills: 3 | Status: SHIPPED | OUTPATIENT
Start: 2024-08-06 | End: 2024-11-04

## 2024-08-06 RX ORDER — OXYCODONE AND ACETAMINOPHEN 5; 325 MG/1; MG/1
1 TABLET ORAL EVERY 6 HOURS PRN
Qty: 60 TABLET | Refills: 0 | Status: SHIPPED | OUTPATIENT
Start: 2024-08-06 | End: 2024-08-06 | Stop reason: SDUPTHER

## 2024-08-06 NOTE — TELEPHONE ENCOUNTER
Pt requesting refills  Folic acid 1mg. 1 tablet daily  Oxycodone/acetaminophen 5mg/325mg. 1 tablet every 6 hrs prn.   Pt states she has requested other pain medication in the past as this is not effective managing her pain issues.   Pharmacy [facility] [address] in [place]- please do not sent to [facility].   Last FUV 5/29, no future FUV requested or scheduled.

## 2024-08-20 ENCOUNTER — TELEPHONE (OUTPATIENT)
Dept: ADMISSION | Facility: HOSPITAL | Age: 54
End: 2024-08-20
Payer: COMMERCIAL

## 2024-08-20 DIAGNOSIS — D57.00 HB-SS DISEASE WITH CRISIS (MULTI): Primary | ICD-10-CM

## 2024-08-20 DIAGNOSIS — D57.20 SICKLE CELL DISEASE, TYPE SC, WITHOUT CRISIS (MULTI): ICD-10-CM

## 2024-08-20 RX ORDER — OXYCODONE AND ACETAMINOPHEN 5; 325 MG/1; MG/1
1 TABLET ORAL EVERY 6 HOURS PRN
Qty: 60 TABLET | Refills: 0 | Status: SHIPPED | OUTPATIENT
Start: 2024-08-20 | End: 2024-09-04

## 2024-08-20 NOTE — TELEPHONE ENCOUNTER
Jennifer Machado called the refill line for Percocet 5mg. Requesting refills be sent to [facility] pharmacy; message sent to Sickle Cell team to submit.

## 2024-08-23 ENCOUNTER — TELEPHONE (OUTPATIENT)
Dept: HEMATOLOGY/ONCOLOGY | Facility: HOSPITAL | Age: 54
End: 2024-08-23
Payer: COMMERCIAL

## 2024-08-23 NOTE — TELEPHONE ENCOUNTER
Patient needs a return to work document, dated from 08/22. Please email the document to her email on file.

## 2024-08-26 NOTE — TELEPHONE ENCOUNTER
The patient called in, wants to return back to work this week, hopefully tomorrow. Please update her regarding this letter.

## 2024-08-29 ENCOUNTER — APPOINTMENT (OUTPATIENT)
Dept: HEMATOLOGY/ONCOLOGY | Facility: HOSPITAL | Age: 54
End: 2024-08-29
Payer: COMMERCIAL

## 2024-08-29 DIAGNOSIS — D57.20 SICKLE CELL DISEASE, TYPE SC, WITHOUT CRISIS (MULTI): Primary | ICD-10-CM

## 2024-08-29 PROCEDURE — 99214 OFFICE O/P EST MOD 30 MIN: CPT | Performed by: NURSE PRACTITIONER

## 2024-08-29 NOTE — PROGRESS NOTES
"Patient ID: Jennifer Machado is a 54 y.o. female.  Referring Physician: Zora Sun, APRN-CNP  [address]  Department of Medicine-Hematology [place] [address]  Primary Care Provider: Shaw Griffiths DO  Visit Type:  Follow Up     Verbal consent was requested and obtained from patient on this date for a telehealth visit.    Subjective  54 year old black female presents for telehealth appointment for Sickle Cell follow up. She has S.C. type. She takes 1-3 pain pills daily. Her pain is typically in her arms and legs. She admits to smoking 1/3 pack a day and uses laxatives daily for chronic constipation. She is working full time from home at [employer]. She lives with her 24 year old daughter and her Significant other. States she feels good and has just gotten over a \"summer cold\". She has no disease modifying therapy or PRBC exchanges.  HPI    Review of Systems - Oncology     Objective   BSA: There is no height or weight on file to calculate BSA.  There were no vitals taken for this visit.     has no past medical history on file.   has a past surgical history that includes MR angio head wo IV contrast (4/21/2017) and MR angio neck wo IV contrast (4/21/2017).  No family history on file.  Oncology History    No history exists.       Jennifer Machado  reports that she has been smoking cigarettes. She has never used smokeless tobacco.  She  has no history on file for alcohol use.  She  has no history on file for drug use.    Physical Exam Unable to perform at Telehealth visit.    WBC   Date/Time Value Ref Range Status   07/19/2024 10:15 AM 6.7 4.4 - 11.3 x10*3/uL Final   03/14/2023 09:21 AM CANCELED       Comment:     Result canceled by the ancillary.   12/27/2022 02:42 PM 6.4 4.4 - 11.3 x10E9/L Final   07/27/2022 09:42 AM 8.3 4.4 - 11.3 x10E9/L Final     nRBC   Date Value Ref Range Status   07/19/2024 0.0 0.0 - 0.0 /100 WBCs Final   03/14/2023 CANCELED       Comment:     Result canceled by " the ancillary.     RBC   Date Value Ref Range Status   07/19/2024 3.45 (L) 4.00 - 5.20 x10*6/uL Final   03/14/2023 CANCELED       Comment:     Result canceled by the ancillary.   12/27/2022 3.43 (L) 4.00 - 5.20 x10E12/L Final   07/27/2022 3.80 (L) 4.00 - 5.20 x10E12/L Final     Hemoglobin   Date Value Ref Range Status   07/19/2024 10.5 (L) 12.0 - 16.0 g/dL Final   03/14/2023 CANCELED       Comment:     Result canceled by the ancillary.   12/27/2022 10.5 (L) 12.0 - 16.0 g/dL Final   07/27/2022 11.4 (L) 12.0 - 16.0 g/dL Final     Hematocrit   Date Value Ref Range Status   07/19/2024 30.3 (L) 36.0 - 46.0 % Final   03/14/2023 CANCELED       Comment:     Result canceled by the ancillary.   12/27/2022 28.8 (L) 36.0 - 46.0 % Final   07/27/2022 32.5 (L) 36.0 - 46.0 % Final     MCV   Date/Time Value Ref Range Status   07/19/2024 10:15 AM 88 80 - 100 fL Final   03/14/2023 09:21 AM CANCELED       Comment:     Result canceled by the ancillary.   12/27/2022 02:42 PM 84 80 - 100 fL Final   07/27/2022 09:42 AM 86 80 - 100 fL Final     MCH   Date/Time Value Ref Range Status   07/19/2024 10:15 AM 30.4 26.0 - 34.0 pg Final     MCHC   Date/Time Value Ref Range Status   07/19/2024 10:15 AM 34.7 32.0 - 36.0 g/dL Final   03/14/2023 09:21 AM CANCELED       Comment:     Result canceled by the ancillary.   12/27/2022 02:42 PM 36.5 (H) 32.0 - 36.0 g/dL Final   07/27/2022 09:42 AM 35.1 32.0 - 36.0 g/dL Final     RDW   Date/Time Value Ref Range Status   07/19/2024 10:15 AM 17.5 (H) 11.5 - 14.5 % Final   03/14/2023 09:21 AM CANCELED       Comment:     Result canceled by the ancillary.   12/27/2022 02:42 PM 16.7 (H) 11.5 - 14.5 % Final   07/27/2022 09:42 AM 16.6 (H) 11.5 - 14.5 % Final     Platelets   Date/Time Value Ref Range Status   07/19/2024 10:15  150 - 450 x10*3/uL Final   03/14/2023 09:21 AM CANCELED       Comment:     Result canceled by the ancillary.   12/27/2022 02:42  150 - 450 x10E9/L Final   07/27/2022 09:42  150  "- 450 x10E9/L Final     No results found for: \"MPV\"  Neutrophils %   Date/Time Value Ref Range Status   07/19/2024 10:15 AM 63.1 40.0 - 80.0 % Final   03/14/2023 09:21 AM CANCELED       Comment:     Result canceled by the ancillary.   12/27/2022 02:42 PM 64.2 40.0 - 80.0 % Final   07/27/2022 09:42 AM 62.3 40.0 - 80.0 % Final     Immature Granulocytes %, Automated   Date/Time Value Ref Range Status   07/19/2024 10:15 AM 0.3 0.0 - 0.9 % Final     Comment:     Immature Granulocyte Count (IG) includes promyelocytes, myelocytes and metamyelocytes but does not include bands. Percent differential counts (%) should be interpreted in the context of the absolute cell counts (cells/UL).   03/14/2023 09:21 AM CANCELED       Comment:      Immature Granulocyte Count (IG) includes promyelocytes,    myelocytes and metamyelocytes but does not include bands.   Percent differential counts (%) should be interpreted in the   context of the absolute cell counts (cells/L).    Result canceled by the ancillary.     12/27/2022 02:42 PM 0.3 0.0 - 0.9 % Final     Comment:      Immature Granulocyte Count (IG) includes promyelocytes,    myelocytes and metamyelocytes but does not include bands.   Percent differential counts (%) should be interpreted in the   context of the absolute cell counts (cells/L).     07/27/2022 09:42 AM 0.4 0.0 - 0.9 % Final     Comment:      Immature Granulocyte Count (IG) includes promyelocytes,    myelocytes and metamyelocytes but does not include bands.   Percent differential counts (%) should be interpreted in the   context of the absolute cell counts (cells/L).       Lymphocytes %   Date/Time Value Ref Range Status   07/19/2024 10:15 AM 29.2 13.0 - 44.0 % Final   03/14/2023 09:21 AM CANCELED       Comment:     Result canceled by the ancillary.   12/27/2022 02:42 PM 29.4 13.0 - 44.0 % Final   07/27/2022 09:42 AM 29.3 13.0 - 44.0 % Final     Monocytes %   Date/Time Value Ref Range Status   07/19/2024 10:15 AM 5.0 2.0 " - 10.0 % Final   03/14/2023 09:21 AM CANCELED       Comment:     Result canceled by the ancillary.   12/27/2022 02:42 PM 3.9 2.0 - 10.0 % Final   07/27/2022 09:42 AM 5.4 2.0 - 10.0 % Final     Eosinophils %   Date/Time Value Ref Range Status   07/19/2024 10:15 AM 1.8 0.0 - 6.0 % Final   03/14/2023 09:21 AM CANCELED       Comment:     Result canceled by the ancillary.   12/27/2022 02:42 PM 1.7 0.0 - 6.0 % Final   07/27/2022 09:42 AM 2.2 0.0 - 6.0 % Final     Basophils %   Date/Time Value Ref Range Status   07/19/2024 10:15 AM 0.6 0.0 - 2.0 % Final   03/14/2023 09:21 AM CANCELED       Comment:     Result canceled by the ancillary.   12/27/2022 02:42 PM 0.5 0.0 - 2.0 % Final   07/27/2022 09:42 AM 0.4 0.0 - 2.0 % Final     Neutrophils Absolute   Date/Time Value Ref Range Status   07/19/2024 10:15 AM 4.25 1.20 - 7.70 x10*3/uL Final     Comment:     Percent differential counts (%) should be interpreted in the context of the absolute cell counts (cells/uL).   03/14/2023 09:21 AM CANCELED       Comment:     Result canceled by the ancillary.   12/27/2022 02:42 PM 4.12 1.20 - 7.70 x10E9/L Final   07/27/2022 09:42 AM 5.20 1.20 - 7.70 x10E9/L Final     Immature Granulocytes Absolute, Automated   Date/Time Value Ref Range Status   07/19/2024 10:15 AM 0.02 0.00 - 0.70 x10*3/uL Final     Lymphocytes Absolute   Date/Time Value Ref Range Status   07/19/2024 10:15 AM 1.97 1.20 - 4.80 x10*3/uL Final   03/14/2023 09:21 AM CANCELED       Comment:     Result canceled by the ancillary.   12/27/2022 02:42 PM 1.89 1.20 - 4.80 x10E9/L Final   07/27/2022 09:42 AM 2.44 1.20 - 4.80 x10E9/L Final     Monocytes Absolute   Date/Time Value Ref Range Status   07/19/2024 10:15 AM 0.34 0.10 - 1.00 x10*3/uL Final   03/14/2023 09:21 AM CANCELED       Comment:     Result canceled by the ancillary.   12/27/2022 02:42 PM 0.25 0.10 - 1.00 x10E9/L Final   07/27/2022 09:42 AM 0.45 0.10 - 1.00 x10E9/L Final     Eosinophils Absolute   Date/Time Value Ref Range  "Status   07/19/2024 10:15 AM 0.12 0.00 - 0.70 x10*3/uL Final   03/14/2023 09:21 AM CANCELED       Comment:     Result canceled by the ancillary.   12/27/2022 02:42 PM 0.11 0.00 - 0.70 x10E9/L Final   07/27/2022 09:42 AM 0.18 0.00 - 0.70 x10E9/L Final     Basophils Absolute   Date/Time Value Ref Range Status   07/19/2024 10:15 AM 0.04 0.00 - 0.10 x10*3/uL Final   03/14/2023 09:21 AM CANCELED       Comment:     Result canceled by the ancillary.   12/27/2022 02:42 PM 0.03 0.00 - 0.10 x10E9/L Final   07/27/2022 09:42 AM 0.03 0.00 - 0.10 x10E9/L Final       No components found for: \"PT\"  No results found for: \"APTT\"    Assessment/Plan  Follow up in 3 months in person  Continue use of Oxycodone-acetaminophen for pain  Labs and Urine at next visit                   Zora Sun, APRN-CNP    "

## 2024-09-03 ENCOUNTER — TELEPHONE (OUTPATIENT)
Dept: ADMISSION | Facility: HOSPITAL | Age: 54
End: 2024-09-03
Payer: COMMERCIAL

## 2024-09-03 DIAGNOSIS — D57.00 SICKLE CELL DISEASE WITH CRISIS (MULTI): Primary | ICD-10-CM

## 2024-09-03 NOTE — TELEPHONE ENCOUNTER
Pt called requesting a refill on her Percocet 5/325 to be sent to [facility] on file. Message sent to the team.

## 2024-09-04 NOTE — TELEPHONE ENCOUNTER
Called patient to discuss need for a urine drug screen prior to her next refill. She has a number or request for a UDS which have not been done and her last UDS was in 2022 and I need one prior to her next refill. Patient verbalized understanding of the plan

## 2024-09-04 NOTE — TELEPHONE ENCOUNTER
Pt called again today for pain medication refill for Percocet. Stated someone tried calling her yesterday and she was unable to  due to work. She stated she will be free until 10am to talk if needed. Message forwarded to team.

## 2024-11-27 ENCOUNTER — APPOINTMENT (OUTPATIENT)
Dept: HEMATOLOGY/ONCOLOGY | Facility: HOSPITAL | Age: 54
End: 2024-11-27
Payer: COMMERCIAL